# Patient Record
Sex: FEMALE | Race: WHITE | NOT HISPANIC OR LATINO | Employment: OTHER | URBAN - METROPOLITAN AREA
[De-identification: names, ages, dates, MRNs, and addresses within clinical notes are randomized per-mention and may not be internally consistent; named-entity substitution may affect disease eponyms.]

---

## 2017-02-07 ENCOUNTER — HISTORICAL (OUTPATIENT)
Dept: FAMILY MEDICINE | Facility: CLINIC | Age: 77
End: 2017-02-07

## 2017-05-17 ENCOUNTER — HISTORICAL (OUTPATIENT)
Dept: FAMILY MEDICINE | Facility: CLINIC | Age: 77
End: 2017-05-17

## 2017-05-17 LAB
ABS NEUT (OLG): 4.86 X10(3)/MCL (ref 2.1–9.2)
ALBUMIN SERPL-MCNC: 4.5 GM/DL (ref 3.4–5)
ALBUMIN/GLOB SERPL: 1 {RATIO}
ALP SERPL-CCNC: 67 UNIT/L (ref 20–120)
ALT SERPL-CCNC: 18 UNIT/L
AST SERPL-CCNC: 23 UNIT/L
BASOPHILS # BLD AUTO: 0.03 X10(3)/MCL
BASOPHILS NFR BLD AUTO: 0 % (ref 0–1)
BILIRUB SERPL-MCNC: 0.9 MG/DL
BILIRUBIN DIRECT+TOT PNL SERPL-MCNC: 0.1 MG/DL
BILIRUBIN DIRECT+TOT PNL SERPL-MCNC: 0.8 MG/DL
BUN SERPL-MCNC: 15 MG/DL (ref 7–25)
CALCIUM SERPL-MCNC: 9.3 MG/DL (ref 8.4–10.3)
CHLORIDE SERPL-SCNC: 106 MMOL/L (ref 96–110)
CHOLEST SERPL-MCNC: 182 MG/DL
CHOLEST/HDLC SERPL: 2.9 {RATIO} (ref 0–4.4)
CO2 SERPL-SCNC: 26 MMOL/L (ref 24–32)
CREAT SERPL-MCNC: 0.95 MG/DL (ref 0.7–1.1)
EOSINOPHIL # BLD AUTO: 0.04 10*3/UL
EOSINOPHIL NFR BLD AUTO: 0 % (ref 0–5)
ERYTHROCYTE [DISTWIDTH] IN BLOOD BY AUTOMATED COUNT: 13.2 % (ref 11.5–14.5)
EST. AVERAGE GLUCOSE BLD GHB EST-MCNC: 108 MG/DL
GLOBULIN SER-MCNC: 3.1 GM/ML (ref 2.3–3.5)
GLUCOSE SERPL-MCNC: 107 MG/DL (ref 65–99)
HBA1C MFR BLD: 5.4 % (ref 4.7–5.6)
HCT VFR BLD AUTO: 42.1 % (ref 35–46)
HDLC SERPL-MCNC: 62 MG/DL
HGB BLD-MCNC: 13.7 GM/DL (ref 12–16)
IMM GRANULOCYTES # BLD AUTO: 0.02 10*3/UL
IMM GRANULOCYTES NFR BLD AUTO: 0 %
LDLC SERPL CALC-MCNC: 102 MG/DL (ref 0–130)
LYMPHOCYTES # BLD AUTO: 3.42 X10(3)/MCL
LYMPHOCYTES NFR BLD AUTO: 39 % (ref 15–40)
MCH RBC QN AUTO: 29.5 PG (ref 26–34)
MCHC RBC AUTO-ENTMCNC: 32.5 GM/DL (ref 31–37)
MCV RBC AUTO: 90.7 FL (ref 80–100)
MONOCYTES # BLD AUTO: 0.48 X10(3)/MCL
MONOCYTES NFR BLD AUTO: 5 % (ref 4–12)
NEUTROPHILS # BLD AUTO: 4.86 X10(3)/MCL
NEUTROPHILS NFR BLD AUTO: 55 X10(3)/MCL
PLATELET # BLD AUTO: 322 X10(3)/MCL (ref 130–400)
PMV BLD AUTO: 11.5 FL (ref 7.4–10.4)
POTASSIUM SERPL-SCNC: 4 MMOL/L (ref 3.6–5.2)
PROT SERPL-MCNC: 7.6 GM/DL (ref 6–8)
RBC # BLD AUTO: 4.64 X10(6)/MCL (ref 4–5.2)
SODIUM SERPL-SCNC: 141 MMOL/L (ref 135–146)
TRIGL SERPL-MCNC: 89 MG/DL
TSH SERPL-ACNC: 1.38 MIU/L (ref 0.5–5)
VLDLC SERPL CALC-MCNC: 18 MG/DL
WBC # SPEC AUTO: 8.8 X10(3)/MCL (ref 4.5–11)

## 2017-10-24 ENCOUNTER — HISTORICAL (OUTPATIENT)
Dept: ADMINISTRATIVE | Facility: HOSPITAL | Age: 77
End: 2017-10-24

## 2017-10-24 LAB
CREAT UR-MCNC: 163 MG/DL
MICROALBUMIN UR-MCNC: 12.4 MG/L (ref 0–19)
MICROALBUMIN/CREAT RATIO PNL UR: 7.6 MCG/MG CR (ref 0–29)

## 2017-11-20 ENCOUNTER — HISTORICAL (OUTPATIENT)
Dept: FAMILY MEDICINE | Facility: CLINIC | Age: 77
End: 2017-11-20

## 2018-04-17 ENCOUNTER — HISTORICAL (OUTPATIENT)
Dept: ADMINISTRATIVE | Facility: HOSPITAL | Age: 78
End: 2018-04-17

## 2018-04-17 LAB
ABS NEUT (OLG): 5.18 X10(3)/MCL (ref 2.1–9.2)
ALBUMIN SERPL-MCNC: 4 GM/DL (ref 3.4–5)
ALBUMIN/GLOB SERPL: 1 RATIO (ref 1–2)
ALP SERPL-CCNC: 98 UNIT/L (ref 45–117)
ALT SERPL-CCNC: 29 UNIT/L (ref 12–78)
AST SERPL-CCNC: 22 UNIT/L (ref 15–37)
BASOPHILS # BLD AUTO: 0.04 X10(3)/MCL
BASOPHILS NFR BLD AUTO: 0 %
BILIRUB SERPL-MCNC: 0.3 MG/DL (ref 0.2–1)
BILIRUBIN DIRECT+TOT PNL SERPL-MCNC: 0.1 MG/DL
BILIRUBIN DIRECT+TOT PNL SERPL-MCNC: 0.2 MG/DL
BUN SERPL-MCNC: 20 MG/DL (ref 7–18)
CALCIUM SERPL-MCNC: 8.9 MG/DL (ref 8.5–10.1)
CHLORIDE SERPL-SCNC: 112 MMOL/L (ref 98–107)
CHOLEST SERPL-MCNC: 181 MG/DL
CHOLEST/HDLC SERPL: 2.4 {RATIO} (ref 0–4.4)
CO2 SERPL-SCNC: 27 MMOL/L (ref 21–32)
CREAT SERPL-MCNC: 1 MG/DL (ref 0.6–1.3)
CREAT UR-MCNC: 134 MG/DL
EOSINOPHIL # BLD AUTO: 0.04 X10(3)/MCL
EOSINOPHIL NFR BLD AUTO: 0 %
ERYTHROCYTE [DISTWIDTH] IN BLOOD BY AUTOMATED COUNT: 13.5 % (ref 11.5–14.5)
EST. AVERAGE GLUCOSE BLD GHB EST-MCNC: 108 MG/DL
GLOBULIN SER-MCNC: 3.8 GM/ML (ref 2.3–3.5)
GLUCOSE SERPL-MCNC: 99 MG/DL (ref 74–106)
HBA1C MFR BLD: 5.4 % (ref 4.2–6.3)
HCT VFR BLD AUTO: 41.6 % (ref 35–46)
HDLC SERPL-MCNC: 74 MG/DL
HGB BLD-MCNC: 13.4 GM/DL (ref 12–16)
IMM GRANULOCYTES # BLD AUTO: 0.02 10*3/UL
IMM GRANULOCYTES NFR BLD AUTO: 0 %
LDLC SERPL CALC-MCNC: 86 MG/DL (ref 0–130)
LYMPHOCYTES # BLD AUTO: 2.86 X10(3)/MCL
LYMPHOCYTES NFR BLD AUTO: 33 % (ref 13–40)
MCH RBC QN AUTO: 29.6 PG (ref 26–34)
MCHC RBC AUTO-ENTMCNC: 32.2 GM/DL (ref 31–37)
MCV RBC AUTO: 91.8 FL (ref 80–100)
MICROALBUMIN UR-MCNC: 9.5 MG/L (ref 0–19)
MICROALBUMIN/CREAT RATIO PNL UR: 7.1 MCG/MG CR (ref 0–29)
MONOCYTES # BLD AUTO: 0.64 X10(3)/MCL
MONOCYTES NFR BLD AUTO: 7 % (ref 4–12)
NEUTROPHILS # BLD AUTO: 5.18 X10(3)/MCL
NEUTROPHILS NFR BLD AUTO: 59 X10(3)/MCL
PLATELET # BLD AUTO: 313 X10(3)/MCL (ref 130–400)
PMV BLD AUTO: 11.7 FL (ref 7.4–10.4)
POTASSIUM SERPL-SCNC: 3.7 MMOL/L (ref 3.5–5.1)
PROT SERPL-MCNC: 7.8 GM/DL (ref 6.4–8.2)
RBC # BLD AUTO: 4.53 X10(6)/MCL (ref 4–5.2)
SODIUM SERPL-SCNC: 141 MMOL/L (ref 136–145)
TRIGL SERPL-MCNC: 106 MG/DL
TSH SERPL-ACNC: 2.24 MIU/L (ref 0.36–3.74)
VLDLC SERPL CALC-MCNC: 21 MG/DL
WBC # SPEC AUTO: 8.8 X10(3)/MCL (ref 4.5–11)

## 2018-06-27 ENCOUNTER — HISTORICAL (OUTPATIENT)
Dept: RADIOLOGY | Facility: HOSPITAL | Age: 78
End: 2018-06-27

## 2019-06-28 ENCOUNTER — HISTORICAL (OUTPATIENT)
Dept: ADMINISTRATIVE | Facility: HOSPITAL | Age: 79
End: 2019-06-28

## 2019-06-28 LAB
ABS NEUT (OLG): 4.99 X10(3)/MCL (ref 2.1–9.2)
ALBUMIN SERPL-MCNC: 4.4 GM/DL (ref 3.4–5)
ALBUMIN/GLOB SERPL: 1.1 RATIO (ref 1.1–2)
ALP SERPL-CCNC: 99 UNIT/L (ref 45–117)
ALT SERPL-CCNC: 25 UNIT/L (ref 12–78)
APPEARANCE, UA: CLEAR
AST SERPL-CCNC: 18 UNIT/L (ref 15–37)
BACTERIA #/AREA URNS AUTO: ABNORMAL /[HPF]
BASOPHILS # BLD AUTO: 0.04 X10(3)/MCL
BASOPHILS NFR BLD AUTO: 0 %
BILIRUB SERPL-MCNC: 0.5 MG/DL (ref 0.2–1)
BILIRUB UR QL STRIP: NEGATIVE
BILIRUBIN DIRECT+TOT PNL SERPL-MCNC: 0.1 MG/DL
BILIRUBIN DIRECT+TOT PNL SERPL-MCNC: 0.4 MG/DL
BUN SERPL-MCNC: 14 MG/DL (ref 7–18)
CALCIUM SERPL-MCNC: 10.2 MG/DL (ref 8.5–10.1)
CHLORIDE SERPL-SCNC: 105 MMOL/L (ref 98–107)
CHOLEST SERPL-MCNC: 201 MG/DL
CHOLEST/HDLC SERPL: 3 {RATIO} (ref 0–4.4)
CO2 SERPL-SCNC: 29 MMOL/L (ref 21–32)
COLOR UR: ABNORMAL
CREAT SERPL-MCNC: 1 MG/DL (ref 0.6–1.3)
CREAT UR-MCNC: 17 MG/DL
EOSINOPHIL # BLD AUTO: 0.05 10*3/UL
EOSINOPHIL NFR BLD AUTO: 1 %
ERYTHROCYTE [DISTWIDTH] IN BLOOD BY AUTOMATED COUNT: 13 % (ref 11.5–14.5)
EST. AVERAGE GLUCOSE BLD GHB EST-MCNC: 117 MG/DL
GLOBULIN SER-MCNC: 3.9 GM/ML (ref 2.3–3.5)
GLUCOSE (UA): NORMAL
GLUCOSE SERPL-MCNC: 97 MG/DL (ref 74–106)
HBA1C MFR BLD: 5.7 % (ref 4.2–6.3)
HCT VFR BLD AUTO: 46.6 % (ref 35–46)
HDLC SERPL-MCNC: 68 MG/DL
HGB BLD-MCNC: 14.7 GM/DL (ref 12–16)
HGB UR QL STRIP: 0.03 MG/DL
HYALINE CASTS #/AREA URNS LPF: ABNORMAL /[LPF]
IMM GRANULOCYTES # BLD AUTO: 0.02 10*3/UL
IMM GRANULOCYTES NFR BLD AUTO: 0 %
KETONES UR QL STRIP: NEGATIVE
LDLC SERPL CALC-MCNC: 105 MG/DL (ref 0–130)
LEUKOCYTE ESTERASE UR QL STRIP: NEGATIVE
LYMPHOCYTES # BLD AUTO: 3.37 X10(3)/MCL
LYMPHOCYTES NFR BLD AUTO: 38 % (ref 13–40)
MCH RBC QN AUTO: 29 PG (ref 26–34)
MCHC RBC AUTO-ENTMCNC: 31.5 GM/DL (ref 31–37)
MCV RBC AUTO: 91.9 FL (ref 80–100)
MICROALBUMIN UR-MCNC: <5 MG/L (ref 0–19)
MICROALBUMIN/CREAT RATIO PNL UR: <29.4 MCG/MG CR (ref 0–29)
MONOCYTES # BLD AUTO: 0.49 X10(3)/MCL
MONOCYTES NFR BLD AUTO: 6 % (ref 4–12)
NEUTROPHILS # BLD AUTO: 4.99 X10(3)/MCL
NEUTROPHILS NFR BLD AUTO: 56 X10(3)/MCL
NITRITE UR QL STRIP: NEGATIVE
PH UR STRIP: 7 [PH] (ref 4.5–8)
PLATELET # BLD AUTO: 336 X10(3)/MCL (ref 130–400)
PMV BLD AUTO: 11.2 FL (ref 7.4–10.4)
POTASSIUM SERPL-SCNC: 3.9 MMOL/L (ref 3.5–5.1)
PROT SERPL-MCNC: 8.3 GM/DL (ref 6.4–8.2)
PROT UR QL STRIP: NEGATIVE
RBC # BLD AUTO: 5.07 X10(6)/MCL (ref 4–5.2)
RBC #/AREA URNS AUTO: ABNORMAL /[HPF]
SODIUM SERPL-SCNC: 138 MMOL/L (ref 136–145)
SP GR UR STRIP: 1 (ref 1–1.03)
SQUAMOUS #/AREA URNS LPF: ABNORMAL /[LPF]
TRIGL SERPL-MCNC: 141 MG/DL
UROBILINOGEN UR STRIP-ACNC: NORMAL
VLDLC SERPL CALC-MCNC: 28 MG/DL
WBC # SPEC AUTO: 9 X10(3)/MCL (ref 4.5–11)
WBC #/AREA URNS AUTO: ABNORMAL /HPF

## 2019-07-08 ENCOUNTER — HISTORICAL (OUTPATIENT)
Dept: RADIOLOGY | Facility: HOSPITAL | Age: 79
End: 2019-07-08

## 2020-03-09 ENCOUNTER — HISTORICAL (OUTPATIENT)
Dept: RADIOLOGY | Facility: HOSPITAL | Age: 80
End: 2020-03-09

## 2020-03-09 LAB
BUN SERPL-MCNC: 18 MG/DL (ref 7–18)
CALCIUM SERPL-MCNC: 9.7 MG/DL (ref 8.5–10.1)
CHLORIDE SERPL-SCNC: 108 MMOL/L (ref 98–107)
CO2 SERPL-SCNC: 28 MMOL/L (ref 21–32)
CREAT SERPL-MCNC: 1 MG/DL (ref 0.6–1.3)
CREAT/UREA NIT SERPL: 18
GLUCOSE SERPL-MCNC: 104 MG/DL (ref 74–106)
POTASSIUM SERPL-SCNC: 3.9 MMOL/L (ref 3.5–5.1)
SODIUM SERPL-SCNC: 141 MMOL/L (ref 136–145)

## 2020-08-03 ENCOUNTER — HISTORICAL (OUTPATIENT)
Dept: RADIOLOGY | Facility: HOSPITAL | Age: 80
End: 2020-08-03

## 2020-11-20 ENCOUNTER — HISTORICAL (OUTPATIENT)
Dept: ADMINISTRATIVE | Facility: HOSPITAL | Age: 80
End: 2020-11-20

## 2020-11-20 LAB
ABS NEUT (OLG): 4.94 X10(3)/MCL (ref 2.1–9.2)
ALBUMIN SERPL-MCNC: 4.3 GM/DL (ref 3.4–4.8)
ALBUMIN/GLOB SERPL: 1.2 RATIO (ref 1.1–2)
ALP SERPL-CCNC: 86 UNIT/L (ref 40–150)
ALT SERPL-CCNC: 15 UNIT/L (ref 0–55)
APPEARANCE, UA: CLEAR
AST SERPL-CCNC: 23 UNIT/L (ref 5–34)
BACTERIA #/AREA URNS AUTO: ABNORMAL /HPF
BASOPHILS # BLD AUTO: 0 X10(3)/MCL (ref 0–0.2)
BASOPHILS NFR BLD AUTO: 0 %
BILIRUB SERPL-MCNC: 0.3 MG/DL
BILIRUB UR QL STRIP: NEGATIVE
BILIRUBIN DIRECT+TOT PNL SERPL-MCNC: 0.1 MG/DL (ref 0–0.5)
BILIRUBIN DIRECT+TOT PNL SERPL-MCNC: 0.2 MG/DL (ref 0–0.8)
BUN SERPL-MCNC: 14 MG/DL (ref 9.8–20.1)
CALCIUM SERPL-MCNC: 9.7 MG/DL (ref 8.4–10.2)
CHLORIDE SERPL-SCNC: 106 MMOL/L (ref 98–107)
CHOLEST SERPL-MCNC: 189 MG/DL
CHOLEST/HDLC SERPL: 3 {RATIO} (ref 0–5)
CO2 SERPL-SCNC: 23 MMOL/L (ref 23–31)
COLOR UR: YELLOW
CREAT SERPL-MCNC: 0.88 MG/DL (ref 0.55–1.02)
DEPRECATED CALCIDIOL+CALCIFEROL SERPL-MC: 64.4 NG/ML (ref 30–80)
EOSINOPHIL # BLD AUTO: 0 X10(3)/MCL (ref 0–0.9)
EOSINOPHIL NFR BLD AUTO: 1 %
ERYTHROCYTE [DISTWIDTH] IN BLOOD BY AUTOMATED COUNT: 13.2 % (ref 11.5–14.5)
EST. AVERAGE GLUCOSE BLD GHB EST-MCNC: 108.3 MG/DL
GLOBULIN SER-MCNC: 3.6 GM/DL (ref 2.4–3.5)
GLUCOSE (UA): NEGATIVE
GLUCOSE SERPL-MCNC: 71 MG/DL (ref 82–115)
HBA1C MFR BLD: 5.4 %
HCT VFR BLD AUTO: 45.2 % (ref 35–46)
HDLC SERPL-MCNC: 55 MG/DL (ref 35–60)
HGB BLD-MCNC: 14.2 GM/DL (ref 12–16)
HGB UR QL STRIP: 0.2
HYALINE CASTS #/AREA URNS LPF: ABNORMAL /LPF
IMM GRANULOCYTES # BLD AUTO: 0.02 10*3/UL
IMM GRANULOCYTES NFR BLD AUTO: 0 %
KETONES UR QL STRIP: NEGATIVE
LDLC SERPL CALC-MCNC: 103 MG/DL (ref 50–140)
LEUKOCYTE ESTERASE UR QL STRIP: 75 LEU/UL
LYMPHOCYTES # BLD AUTO: 3.4 X10(3)/MCL (ref 0.6–4.6)
LYMPHOCYTES NFR BLD AUTO: 38 %
MCH RBC QN AUTO: 29 PG (ref 26–34)
MCHC RBC AUTO-ENTMCNC: 31.4 GM/DL (ref 31–37)
MCV RBC AUTO: 92.2 FL (ref 80–100)
MONOCYTES # BLD AUTO: 0.5 X10(3)/MCL (ref 0.1–1.3)
MONOCYTES NFR BLD AUTO: 6 %
NEUTROPHILS # BLD AUTO: 4.94 X10(3)/MCL (ref 2.1–9.2)
NEUTROPHILS NFR BLD AUTO: 55 %
NITRITE UR QL STRIP: NEGATIVE
PH UR STRIP: 5.5 [PH] (ref 4.5–8)
PLATELET # BLD AUTO: 306 X10(3)/MCL (ref 130–400)
PMV BLD AUTO: 12.3 FL (ref 7.4–10.4)
POTASSIUM SERPL-SCNC: 4 MMOL/L (ref 3.5–5.1)
PROT SERPL-MCNC: 7.9 GM/DL (ref 5.8–7.6)
PROT UR QL STRIP: NEGATIVE
RBC # BLD AUTO: 4.9 X10(6)/MCL (ref 4–5.2)
RBC #/AREA URNS AUTO: ABNORMAL /HPF
SODIUM SERPL-SCNC: 143 MMOL/L (ref 136–145)
SP GR UR STRIP: 1.02 (ref 1–1.03)
SQUAMOUS #/AREA URNS LPF: ABNORMAL /LPF
TRIGL SERPL-MCNC: 155 MG/DL (ref 37–140)
TSH SERPL-ACNC: 2.1 UIU/ML (ref 0.35–4.94)
UROBILINOGEN UR STRIP-ACNC: NORMAL
VLDLC SERPL CALC-MCNC: 31 MG/DL
WBC # SPEC AUTO: 8.9 X10(3)/MCL (ref 4.5–11)
WBC #/AREA URNS AUTO: ABNORMAL /HPF

## 2020-11-22 LAB — FINAL CULTURE: NO GROWTH

## 2021-03-18 ENCOUNTER — HISTORICAL (OUTPATIENT)
Dept: RADIOLOGY | Facility: HOSPITAL | Age: 81
End: 2021-03-18

## 2022-04-11 ENCOUNTER — HISTORICAL (OUTPATIENT)
Dept: ADMINISTRATIVE | Facility: HOSPITAL | Age: 82
End: 2022-04-11
Payer: MEDICARE

## 2022-04-26 VITALS
SYSTOLIC BLOOD PRESSURE: 144 MMHG | BODY MASS INDEX: 27.92 KG/M2 | WEIGHT: 129.44 LBS | OXYGEN SATURATION: 100 % | DIASTOLIC BLOOD PRESSURE: 76 MMHG | HEIGHT: 57 IN

## 2022-05-04 NOTE — HISTORICAL [facility]
This is a historical note converted from [place]. Formatting and pictures may have been removed.  Please reference [place] for original formatting and attached multimedia. Chief Complaint  Routine visit for refill on Rx  History of Present Illness  81 yo WF  ?   HTN: BP at goal today 124/78, Compliant with?medications: Norvasc 10mg?and?Lisinopril 2.5mg daily. ?Denies any?lightheadedness or dizziness  ?   HLD:?compliant with Pravastatin 40mg?QD, no myalgias. last FLP 6/19 chol 201  ?   CKD: IIIA -?avoiding NSAIDS and other nephrotoxic medications  ?   Osteopenia: DEXA 11/17-Mild osteopenia in the lumbar spine and femoral necks.  Average T score values are not significantly changed since 2014.  -did not want to take fosamax, complaint with?calcium /vitamin D supplementation  ?   MMG last 08/2020 BIRADS 1  Review of Systems  Constitutional: no fevers  respiratory: no SOB  Physical Exam  Vitals & Measurements  T:?36.6? ?C (Oral)? HR:?79(Peripheral)? RR:?16? BP:?124/78?  HT:?145.00?cm? WT:?60.700?kg? BMI:?28.87?  General: appears well, in no acute distress  Eye: no scleral icterus  HENT: MMM, torus palatinus,?oropharynx without erythema/exudate  Neck: supple, no lymphadenopathy, no carotid bruits  Respiratory: clear to auscultation bilaterally, nonlabored respirations  Cardiovascular: regular rate and rhythm without murmurs or?gallops, no edema  Gastrointestinal:?soft, non-tender, non-distended  Genitourinary: no suprapubic tenderness  Musculoskeletal: no gross deformities  Integumentary: no acute rashes or skin lesions present  Neuro: Mild facial tremor at rest, no masked facies, no?extremity?resting or?intentional tremor, no cogwheel rigidity, no leadpipe?rigidity  Assessment/Plan  1.?Hypertension?I10  ?At goal, continue current meds  Advise home BP monitoring  Labs as below  ?  RTC 3 months  Ordered:  CBC w/ Auto Diff, Routine collect, 11/20/20 13:40:00 CST, Blood, Stop date 11/20/20 13:40:00 CST, Lab Collect, Venous  Draw, Hypertension, Print Label By Order Location, 11/20/20 12:50:00 CST  Clinic Follow up, *Est. 02/20/21 3:00:00 CST, Order for future visit, Hypertension  Chronic Kidney Disease (CKD), Stage II (Mild)(  Confirmed  )  Hyperlipidemia(  Confirmed  )  Osteopenia, [facility]  Comprehensive Metabolic Panel, Routine collect, 11/20/20 13:40:00 CST, Blood, Stop date 11/20/20 13:40:00 CST, Lab Collect, Venous Draw, Hypertension, Print Label By Order Location, 11/20/20 12:50:00 CST  Hemoglobin A1C [facility], Routine collect, 11/20/20 13:40:00 CST, Blood, Stop date 11/20/20 13:40:00 CST, Lab Collect, Venous Draw, Hypertension, Print Label By Order Location, 11/20/20 12:50:00 CST  Lipid Panel, Routine collect, 11/20/20 13:40:00 CST, Blood, Stop date 11/20/20 13:40:00 CST, Lab Collect, Venous Draw, Hypertension, Print Label By Order Location, 11/20/20 12:50:00 CST  Thyroid Stimulating Hormone, Routine collect, 11/20/20 13:40:00 CST, Blood, Stop date 11/20/20 13:40:00 CST, Lab Collect, Venous Draw, Hypertension, Print Label By Order Location, 11/20/20 12:50:00 CST  Urinalysis Microscopic [facility], Routine collect, Urine, Collected, 11/20/20 13:15:00 CST, Stop date 11/20/20 13:15:00 CST, Nurse collect, Hypertension, Print Label By Order Location  Urinalysis with Microscopic if Indicated, Routine collect, Urine, 11/20/20 13:39:00 CST, Stop date 11/20/20 13:39:00 CST, Nurse collect, Hypertension, Print Label By Order Location  Vitamin D, 25-Hydroxy Level, Routine collect, 11/20/20 13:40:00 CST, Blood, Stop date 11/20/20 13:40:00 CST, Lab Collect, Venous Draw, Hypertension, 11/20/20 12:50:00 CST  ?  2.?Chronic Kidney Disease (CKD), Stage II (Mild)(  Confirmed  )?N18.2  ?Cautioned against use of NSAIDs,?advised to continue good oral hydration at home  Ordered:  Clinic Follow up, *Est. 02/20/21 3:00:00 CST, Order for future visit, Hypertension  Chronic Kidney Disease (CKD), Stage II (Mild)(  Confirmed  )   Hyperlipidemia(  Confirmed  )  Osteopenia, [facility]  ?  3.?Hyperlipidemia(  Confirmed  )?E78.5  ?Lipid panel today, patient ate breakfast but not lunch, will obtain due to COVID-19 limiting their ability to get to clinic  Ordered:  Clinic Follow up, *Est. 02/20/21 3:00:00 CST, Order for future visit, Hypertension  Chronic Kidney Disease (CKD), Stage II (Mild)(  Confirmed  )  Hyperlipidemia(  Confirmed  )  Osteopenia, [facility]  ?  4.?Osteopenia?M85.80  ?Again?declines bisphosphonate  Continue vitamin D and calcium supplementation  Ordered:  Clinic Follow up, *Est. 02/20/21 3:00:00 CST, Order for future visit, Hypertension  Chronic Kidney Disease (CKD), Stage II (Mild)(  Confirmed  )  Hyperlipidemia(  Confirmed  )  Osteopenia, [facility]  ?  Referrals  Clinic Follow up, *Est. 02/20/21 3:00:00 CST, Order for future visit, Hypertension  Chronic Kidney Disease (CKD), Stage II (Mild)(  Confirmed  )  Hyperlipidemia(  Confirmed  )  Osteopenia, [facility]   Problem List/Past Medical History  Ongoing  Chronic Kidney Disease (CKD), Stage II (Mild)(  Confirmed  )  Hyperlipidemia(  Confirmed  )  Hypertension  Historical  No qualifying data  Procedure/Surgical History  Inspection of Lower Intestinal Tract, Via Natural or Artificial Opening Endoscopic (10/10/2016)  Sigmoidoscopy, flexible; diagnostic, including collection of specimen(s) by brushing or washing, when performed (separate procedure) (10/10/2016)  sinus surgery  tonsilllectomy  tubaligation   Medications  amlodipine 10 mg oral tablet, 10 mg= 1 tab(s), Oral, Daily, 3 refills  aspirin 81 mg oral tablet, 81 mg= 1 tab(s), Oral, Daily  lisinopril 2.5 mg oral tablet, See Instructions, 3 refills  multivitamin with minerals (Adult Tab), 1 tab(s), Oral, Daily  pravastatin 40 mg oral tablet, 40 mg= 1 tab(s), Oral, Once a day (at bedtime), 3 refills  Vitamin D3 1000 intl units oral  tablet, 1000 International unit= 1 tab(s), Oral, Daily, 3 refills  Allergies  No Known Allergies  Social History  Abuse/Neglect  No, No, Yes, 07/31/2020  Alcohol - Denies Alcohol Use, 11/04/2015  Wine, 1-2 times per month, Alcohol use interferes with work or home: No. Others hurt by drinking: No. Household alcohol concerns: Yes., 06/28/2019  Employment/School  Retired, 04/29/2015  Home/Environment  Lives with Spouse. Alcohol abuse in household: No. Substance abuse in household: No. Smoker in household: No. Injuries/Abuse/Neglect in household: No. Feels unsafe at home: No. Safe place to go: Yes. Agency(s)/Others notified: No. Family/Friends available for support: Yes. Concern for family members at home: No. Major illness in household: No. Financial concerns: Yes. TV/Computer concerns: No., 01/15/2019  Nutrition/Health  Regular, Wants to lose weight: No. Sleeping concerns: No. Feels highly stressed: No., 01/15/2019  Sexual  Sexually active: No. Sexually active at age 20 Years. Number of current partners 1. Number of lifetime partners 1. Sexual orientation: Straight or heterosexual. Uses condoms: No. History of sexual abuse: No. Gender Identity Identifies as female., 01/15/2019  Substance Use - Denies Substance Abuse, 11/04/2015  Never, 04/29/2015  Tobacco - Denies Tobacco Use, 11/04/2015  Never (less than 100 in lifetime), No, 11/20/2020  Family History  Malignant lymphoma: Brother.  Tobacco abuse: Father.  Immunizations  Vaccine Date Status   influenza virus vaccine, inactivated 10/03/2020 Recorded   influenza virus vaccine, inactivated 10/02/2019 Recorded   influenza virus vaccine, inactivated 09/20/2018 Recorded   zoster vaccine live 03/06/2018 Recorded   influenza virus vaccine, inactivated 10/24/2017 Given   pneumococcal 23-polyvalent vaccine 05/23/2017 Given   influenza virus vaccine, inactivated 11/11/2016 Given   influenza virus vaccine, inactivated 11/04/2015 Given   influenza virus vaccine, inactivated  10/07/2014 Recorded   influenza virus vaccine, inactivated 10/23/2012 Recorded   influenza virus vaccine, inactivated 10/07/2011 Recorded   influenza virus vaccine, inactivated 10/28/2010 Recorded   influenza virus vaccine, inactivated 10/14/2009 Recorded   influenza virus vaccine, inactivated 11/14/2007 Recorded   influenza virus vaccine, inactivated 11/18/2005 Recorded   Health Maintenance  Health Maintenance  ???Pending?(in the next year)  ??? ??OverDue  ??? ? ? ?Hypertension Management-Education due??04/17/19??and every 1??year(s)  ??? ? ? ?Bone Density Screening due??11/20/19??and every 2??year(s)  ??? ? ? ?Advance Directive due??01/02/20??and every 1??year(s)  ??? ??Due?  ??? ? ? ?Medicare Annual Wellness Exam due??11/20/20??and every 1??year(s)  ??? ? ? ?Zoster Vaccine due??11/20/20??Unknown Frequency  ??? ??Due In Future?  ??? ? ? ?Obesity Screening not due until??01/01/21??and every 1??year(s)  ??? ? ? ?Cognitive Screening not due until??01/02/21??and every 1??year(s)  ??? ? ? ?Fall Risk Assessment not due until??01/02/21??and every 1??year(s)  ??? ? ? ?Functional Assessment not due until??01/02/21??and every 1??year(s)  ??? ? ? ?ADL Screening not due until??02/07/21??and every 1??year(s)  ??? ? ? ?Diabetes Screening not due until??03/09/21??and every 1??year(s)  ??? ? ? ?Hypertension Management-BMP not due until??03/09/21??and every 1??year(s)  ??? ? ? ?Aspirin Therapy for CVD Prevention not due until??07/31/21??and every 1??year(s)  ??? ? ? ?Influenza Vaccine not due until??10/01/21??and every 1??day(s)  ???Satisfied?(in the past 1 year)  ??? ??Satisfied?  ??? ? ? ?ADL Screening on??02/07/20.??Satisfied by Yanci Youngblood LPN  ??? ? ? ?Aspirin Therapy for CVD Prevention on??07/31/20.??Satisfied by Rosangela Rowan MD  ??? ? ? ?Blood Pressure Screening on??11/20/20.??Satisfied by Gordy Mendoza  ??? ? ? ?Body Mass Index Check on??11/20/20.??Satisfied by Gordy Mendoza  ??? ? ? ?Breast Cancer  Screening on??08/03/20.??Satisfied by Arlette Kraft  ??? ? ? ?Cognitive Screening on??11/20/20.??Satisfied by Gordy Mendoza  ??? ? ? ?Depression Screening on??11/20/20.??Satisfied by Gordy Mendoza  ??? ? ? ?Diabetes Screening on??03/09/20.??Satisfied by Guadalupe Mandel  ??? ? ? ?Fall Risk Assessment on??11/20/20.??Satisfied by Gordy Mendoza  ??? ? ? ?Functional Assessment on??02/07/20.??Satisfied by Yanci Youngblood LPN  ??? ? ? ?Hypertension Management-Blood Pressure on??11/20/20.??Satisfied by Gordy Mendoza  ??? ? ? ?Hypertension Management-BMP on??03/09/20.??Satisfied by Guadalupe Mandel  ??? ? ? ?Influenza Vaccine on??10/03/20.??Satisfied by Gordy Mendoza  ??? ? ? ?Obesity Screening on??11/20/20.??Satisfied by Gordy Mendoza  ?      ????I saw and evaluated the patient. I discussed with the resident and agree with the residents findings and plan as documented in the residents note.

## 2022-05-06 ENCOUNTER — OFFICE VISIT (OUTPATIENT)
Dept: FAMILY MEDICINE | Facility: CLINIC | Age: 82
End: 2022-05-06
Payer: MEDICARE

## 2022-05-06 VITALS
DIASTOLIC BLOOD PRESSURE: 78 MMHG | TEMPERATURE: 97 F | SYSTOLIC BLOOD PRESSURE: 135 MMHG | BODY MASS INDEX: 27.57 KG/M2 | RESPIRATION RATE: 20 BRPM | OXYGEN SATURATION: 98 % | HEART RATE: 58 BPM | HEIGHT: 57 IN | WEIGHT: 127.81 LBS

## 2022-05-06 DIAGNOSIS — E78.5 HYPERLIPIDEMIA, UNSPECIFIED HYPERLIPIDEMIA TYPE: ICD-10-CM

## 2022-05-06 DIAGNOSIS — I10 HYPERTENSION, UNSPECIFIED TYPE: Primary | ICD-10-CM

## 2022-05-06 PROBLEM — N18.2 STAGE 2 CHRONIC KIDNEY DISEASE: Status: ACTIVE | Noted: 2022-05-06

## 2022-05-06 PROCEDURE — 99213 OFFICE O/P EST LOW 20 MIN: CPT | Mod: PBBFAC

## 2022-05-06 RX ORDER — AMLODIPINE BESYLATE 10 MG/1
10 TABLET ORAL DAILY
Qty: 90 EACH | Refills: 3 | Status: SHIPPED | OUTPATIENT
Start: 2022-05-06 | End: 2022-05-06 | Stop reason: SDUPTHER

## 2022-05-06 RX ORDER — AMLODIPINE BESYLATE 10 MG/1
10 TABLET ORAL DAILY
COMMUNITY
Start: 2022-01-11 | End: 2023-01-27 | Stop reason: SDUPTHER

## 2022-05-06 RX ORDER — PRAVASTATIN SODIUM 40 MG/1
40 TABLET ORAL DAILY
Qty: 90 TABLET | Refills: 3 | Status: SHIPPED | OUTPATIENT
Start: 2022-05-06 | End: 2023-01-27 | Stop reason: SDUPTHER

## 2022-05-06 RX ORDER — LISINOPRIL 20 MG/1
20 TABLET ORAL DAILY
Qty: 90 TABLET | Refills: 3 | Status: SHIPPED | OUTPATIENT
Start: 2022-05-06 | End: 2023-01-27 | Stop reason: SDUPTHER

## 2022-05-06 RX ORDER — NAPROXEN SODIUM 220 MG/1
81 TABLET, FILM COATED ORAL DAILY
Qty: 30 TABLET | Refills: 3 | Status: SHIPPED | OUTPATIENT
Start: 2022-05-06 | End: 2023-01-27 | Stop reason: SDUPTHER

## 2022-05-06 NOTE — PROGRESS NOTES
Yue Encarnacion is a 82yr female w/ PMH HTN, HLD, CKD. She was last seen in our clinic 1/11/22.    Acute issues:  No complaints or concerns.  Medication refills.    Chronic issues:  Hypertension:  -Compliant with medications.  -No side effects of medications.  -Denies chest pain, SOB, abdominal pain, N/V, diaphoresis, headache, vision changes, and dizziness    HLD:  -Compliant with medication  -Denies muscle cramps      Activities of Daily Living:   Able to bath, dress, transfer, toilet, continent, feed on her own    Instrumental activities of daily living:  -Able to use telephone, shop, prepare meals, housekeeping, laundry, and handle her and her 's medications on her own  -Doesn't drive        Review of Systems:  Constitutional: no N/V, no headaches, no dizziness  CV: no edema, no chest pain, no SOB  GI: no abdominal pain, no diarrhea, no constipation, no hematuria   Neuro: no numbness/tingling, no generalized weakness, no falls       PE:  Vitals:    05/06/22 0912   BP: 135/78   Pulse: (!) 58   Resp: 20   Temp: 97.4 °F (36.3 °C)       General: The patient is pleasant and cooperative and in no acute distress.  Ears: The external ear canals are patent and not inflamed.  Mouth: Palate is intact. Mucous membranes are moist.   Chest: Respirations are non-labored.  Clear to auscultation with bilateral breath sounds.  Cardiovascular: Regular rate and rhythm.    Extremities: Moves all extremities equally and symmetrically. BUE & BLE strength: 5/5.  Neurological: No neurological deficits. Alert and oriented.    P/A:  HTN  -Controlled  -Continue on current treatment  -Refilled medications  -Limit salt intake to 2g/day, less fried foods, more grilled foods  -Daily aerobic exercise    HLD  -Controlled  -Continue on current treatment  -Refilled medication      Patient will come to main lab in 1 month for labs

## 2022-05-09 NOTE — PROGRESS NOTES
Faculty Attestation: Patient was seen in [facility]. Patient was seen and evaluated by me. I have personally reviewed History of the Present Illness , Review of Systems, PFSH , Physical Exam, Assessment and Plan documented by the resident. I participated in the management of the patient and was immediately available throughout the encounter. Importance of adherence to treatment recommendations discussed with patient at the time of the visit. Services were furnished in a primary care center in the outpatient department at a [facility] hospital. I discussed the patient with the resident and agree with resident's findings and plan as documented in the resident note.   Agree with ordered labs and plan for close follow up.  Yvonne Silva MD

## 2022-05-09 NOTE — PROGRESS NOTES
Faculty Attestation: Patient was seen in [facility]. Patient was seen and evaluated by me. I have personally reviewed History of the Present Illness , Review of Systems, PFSH , Physical Exam, Assessment and Plan documented by the resident. I participated in the management of the patient and was immediately available throughout the encounter. Importance of adherence to treatment recommendations discussed with patient at the time of the visit. Services were furnished in a primary care center in the outpatient department at a [facility] hospital. I discussed the patient with the resident and agree with resident's findings and plan as documented in the resident note. Yvonne Silva MD

## 2022-10-25 ENCOUNTER — OFFICE VISIT (OUTPATIENT)
Dept: FAMILY MEDICINE | Facility: CLINIC | Age: 82
End: 2022-10-25
Payer: MEDICARE

## 2022-10-25 VITALS
WEIGHT: 126.13 LBS | TEMPERATURE: 98 F | BODY MASS INDEX: 27.21 KG/M2 | HEIGHT: 57 IN | RESPIRATION RATE: 18 BRPM | DIASTOLIC BLOOD PRESSURE: 72 MMHG | OXYGEN SATURATION: 99 % | HEART RATE: 72 BPM | SYSTOLIC BLOOD PRESSURE: 132 MMHG

## 2022-10-25 DIAGNOSIS — E78.5 HYPERLIPIDEMIA, UNSPECIFIED HYPERLIPIDEMIA TYPE: ICD-10-CM

## 2022-10-25 DIAGNOSIS — I10 HYPERTENSION, UNSPECIFIED TYPE: Primary | ICD-10-CM

## 2022-10-25 DIAGNOSIS — Z23 IMMUNIZATION DUE: ICD-10-CM

## 2022-10-25 PROCEDURE — G0008 ADMIN INFLUENZA VIRUS VAC: HCPCS | Mod: PBBFAC

## 2022-10-25 PROCEDURE — 99213 OFFICE O/P EST LOW 20 MIN: CPT | Mod: PBBFAC,25

## 2022-10-25 NOTE — PROGRESS NOTES
Yue Encarnacion is a 82 yr female w/ PMH HTN, HLD, CKD. She was last seen in our clinic 5/6/22. She wants the flu shot and medication refills.     Acute issues:  No complaints or concerns.  Medication refills.    Chronic issues:  Hypertension:  -Currently taking Norvasc 10 mg, Lisinopril 20 mg, ASA 81 mg   -Compliant with medications.  -No side effects of medications.  -Denies chest pain, SOB, abdominal pain, N/V, diaphoresis, headache, vision changes, and dizziness     HLD:  -Currently taking Pravastatin 40 mg   -Compliant with medication  -Denies muscle cramps    Activities of Daily Living:  -Able to bath, dress, transfer, toilet, continent, feed on her own    Instrumental activities of daily living:  -Able to use telephone, shop, prepare meals, housekeeping, laundry, and handle her and her 's medications on her own  -Can drive and drives her best friends car when they play bridge    Healthcare maintenance  -Mammogram: Last one on 8/3/20, BI-RADS 1  -Colon cancer screening: Sigmoidoscopy done 10/10/2016  -Falls: No  -Mobility: Good, no need of walker/cane  -DEXA scan: Done on 13/18/21 showed Normal bone mineral density of the lumbar vertebrae. Improved and mild osteopenia of the femurs.  -COVID vaccine- declined 1/11/22  -Flu vaccine: Due at this time       Review of Systems:  See above     PE  Vitals:    10/25/22 0949   BP: 132/72   Pulse: 72   Resp: 18   Temp: 97.7 °F (36.5 °C)     General: pleasant and cooperative male, in no acute distress  Lungs: Clear to auscultation bilaterally   Heart: RRR  Abdomen: soft, non tender, with positive bowel sounds  Extremities: Moves all extremities equally and symmetrically. BUE & BLE strength 5/5  Neuro: Alert and oriented. No focal neurologic deficits noted.    A/P  HTN   -Refilled medication   -Recommend DASH diet  -Encourage regular aerobic exercise of walking for at least 3-4 days/week     HLD  -Refilled medication    Immunization Due   -Went over risk benefit,  and patient was agreeable to receiving Flu vaccine today in office        RTC in 3 months

## 2022-12-17 ENCOUNTER — OFFICE VISIT (OUTPATIENT)
Dept: URGENT CARE | Facility: CLINIC | Age: 82
End: 2022-12-17
Payer: MEDICARE

## 2022-12-17 VITALS
RESPIRATION RATE: 20 BRPM | WEIGHT: 123 LBS | BODY MASS INDEX: 27.67 KG/M2 | TEMPERATURE: 98 F | HEART RATE: 83 BPM | SYSTOLIC BLOOD PRESSURE: 138 MMHG | DIASTOLIC BLOOD PRESSURE: 85 MMHG | OXYGEN SATURATION: 97 % | HEIGHT: 56 IN

## 2022-12-17 DIAGNOSIS — R09.81 SINUS CONGESTION: Primary | ICD-10-CM

## 2022-12-17 DIAGNOSIS — U07.1 COVID: ICD-10-CM

## 2022-12-17 DIAGNOSIS — U07.1 COVID-19 VIRUS DETECTED: ICD-10-CM

## 2022-12-17 LAB
CTP QC/QA: YES
CTP QC/QA: YES
FLUAV AG NPH QL: NEGATIVE
FLUBV AG NPH QL: NEGATIVE
SARS-COV-2 RDRP RESP QL NAA+PROBE: POSITIVE

## 2022-12-17 PROCEDURE — 87804 INFLUENZA ASSAY W/OPTIC: CPT | Mod: PBBFAC

## 2022-12-17 PROCEDURE — 99213 PR OFFICE/OUTPT VISIT, EST, LEVL III, 20-29 MIN: ICD-10-PCS | Mod: S$PBB,CR,,

## 2022-12-17 PROCEDURE — 99214 OFFICE O/P EST MOD 30 MIN: CPT | Mod: PBBFAC

## 2022-12-17 PROCEDURE — 99213 OFFICE O/P EST LOW 20 MIN: CPT | Mod: S$PBB,CR,,

## 2022-12-17 PROCEDURE — 87635 SARS-COV-2 COVID-19 AMP PRB: CPT | Mod: PBBFAC

## 2022-12-17 RX ORDER — FLUTICASONE PROPIONATE 50 MCG
2 SPRAY, SUSPENSION (ML) NASAL DAILY
Qty: 18.2 ML | Refills: 0 | Status: SHIPPED | OUTPATIENT
Start: 2022-12-17 | End: 2023-09-06

## 2022-12-17 RX ORDER — ALBUTEROL SULFATE 90 UG/1
2 AEROSOL, METERED RESPIRATORY (INHALATION) EVERY 6 HOURS PRN
Qty: 6.7 G | Refills: 0 | Status: SHIPPED | OUTPATIENT
Start: 2022-12-17 | End: 2023-09-06

## 2022-12-17 NOTE — PROGRESS NOTES
"Subjective:       Patient ID: Yue Encarnacion is a 82 y.o. female.    Vitals:  height is 4' 7.91" (1.42 m) and weight is 55.8 kg (123 lb). Her temperature is 97.7 °F (36.5 °C). Her blood pressure is 138/85 and her pulse is 83. Her respiration is 20 and oxygen saturation is 97%.     Chief Complaint: Sinus Problem (X mon)    Pt states sinus congestion, hoarse voice, and headache starting on Monday. States a family member in the home is currently sick with same symptoms.    Sinus Problem  Associated symptoms include congestion, coughing, headaches and sinus pressure.     Constitution: Negative.   HENT:  Positive for congestion and sinus pressure.    Neck: neck negative.   Cardiovascular: Negative.    Eyes: Negative.    Respiratory:  Positive for cough.    Gastrointestinal: Negative.    Genitourinary: Negative.    Musculoskeletal: Negative.    Skin: Negative.    Neurological:  Positive for headaches.     Objective:      Physical Exam   Constitutional: She is oriented to person, place, and time. normal  HENT:   Head: Normocephalic.   Ears:   Right Ear: Tympanic membrane, external ear and ear canal normal.   Left Ear: Tympanic membrane and external ear normal.   Nose: Congestion present.   Mouth/Throat: Uvula is midline, oropharynx is clear and moist and mucous membranes are normal. Mucous membranes are moist. Oropharynx is clear.   Eyes: Pupils are equal, round, and reactive to light.   Neck: Neck supple.   Cardiovascular: Normal rate, regular rhythm, normal heart sounds and normal pulses.   Pulmonary/Chest: Effort normal. She has wheezes.   Abdominal: Normal appearance. Soft.   Musculoskeletal: Normal range of motion.         General: Normal range of motion.   Neurological: She is alert and oriented to person, place, and time.   Skin: Skin is warm and dry.   Vitals reviewed.      Results for orders placed or performed in visit on 12/17/22   POCT COVID-19 Rapid Screening   Result Value Ref Range    POC Rapid COVID Positive " (A) Negative     Acceptable Yes    POCT Influenza A/B   Result Value Ref Range    Rapid Influenza A Ag Negative Negative    Rapid Influenza B Ag Negative Negative     Acceptable Yes        Assessment:       1. Sinus congestion    2. COVID            Plan:         Sinus congestion  -     POCT COVID-19 Rapid Screening  -     POCT Influenza A/B    COVID  -     fluticasone propionate (FLONASE) 50 mcg/actuation nasal spray; 2 sprays (100 mcg total) by Each Nostril route once daily.  Dispense: 18.2 mL; Refill: 0  -     nirmatrelvir-ritonavir 300 mg (150 mg x 2)-100 mg copackaged tablets (EUA); Take 3 tablets by mouth 2 (two) times daily for 5 days. Each dose contains 2 nirmatrelvir (pink tablets) and 1 ritonavir (white tablet). Take all 3 tablets together  Dispense: 30 tablet; Refill: 0  -     albuterol (PROVENTIL HFA) 90 mcg/actuation inhaler; Inhale 2 puffs into the lungs every 6 (six) hours as needed for Wheezing. Rescue  Dispense: 6.7 g; Refill: 0    Other orders  -     Discontinue: nirmatrelvir-ritonavir 300 mg (150 mg x 2)-100 mg copackaged tablets (EUA); Take 3 tablets by mouth 2 (two) times daily for 5 days. Each dose contains 2 nirmatrelvir (pink tablets) and 1 ritonavir (white tablet). Take all 3 tablets together  Dispense: 30 tablet; Refill: 0    Please drink plenty of fluids.  Please get plenty of rest.  Please return here or go to the Emergency Department for any concerns or worsening of condition.      Take over the counter Tylenol (Acetaminophen) and/or Motrin (Ibuprofen) as directed for control of pain and/or fever.  Please follow up with your primary care doctor.        ER precautions given, patient verbalized understanding.     Please see provided patient education for guidance.    Follow up with PCP or return to clinic if symptoms worsen or do not improve.

## 2023-01-27 ENCOUNTER — OFFICE VISIT (OUTPATIENT)
Dept: FAMILY MEDICINE | Facility: CLINIC | Age: 83
End: 2023-01-27
Payer: MEDICARE

## 2023-01-27 VITALS
OXYGEN SATURATION: 98 % | HEART RATE: 70 BPM | HEIGHT: 55 IN | TEMPERATURE: 98 F | RESPIRATION RATE: 16 BRPM | WEIGHT: 123 LBS | SYSTOLIC BLOOD PRESSURE: 122 MMHG | BODY MASS INDEX: 28.46 KG/M2 | DIASTOLIC BLOOD PRESSURE: 66 MMHG

## 2023-01-27 DIAGNOSIS — Z28.21 IMMUNIZATION DECLINED: ICD-10-CM

## 2023-01-27 DIAGNOSIS — I10 HYPERTENSION, UNSPECIFIED TYPE: Primary | ICD-10-CM

## 2023-01-27 PROCEDURE — 99213 OFFICE O/P EST LOW 20 MIN: CPT | Mod: PBBFAC

## 2023-01-27 RX ORDER — NAPROXEN SODIUM 220 MG/1
81 TABLET, FILM COATED ORAL DAILY
Qty: 30 TABLET | Refills: 3 | Status: SHIPPED | OUTPATIENT
Start: 2023-01-27 | End: 2023-06-14 | Stop reason: SDUPTHER

## 2023-01-27 RX ORDER — AMLODIPINE BESYLATE 10 MG/1
10 TABLET ORAL DAILY
Qty: 90 TABLET | Refills: 3 | Status: SHIPPED | OUTPATIENT
Start: 2023-01-27 | End: 2023-06-14 | Stop reason: SDUPTHER

## 2023-01-27 RX ORDER — LISINOPRIL 20 MG/1
20 TABLET ORAL DAILY
Qty: 90 TABLET | Refills: 3 | Status: SHIPPED | OUTPATIENT
Start: 2023-01-27 | End: 2023-06-14 | Stop reason: SDUPTHER

## 2023-01-27 RX ORDER — PRAVASTATIN SODIUM 40 MG/1
40 TABLET ORAL DAILY
Qty: 90 TABLET | Refills: 3 | Status: SHIPPED | OUTPATIENT
Start: 2023-01-27 | End: 2023-06-14 | Stop reason: SDUPTHER

## 2023-01-27 NOTE — PROGRESS NOTES
[facility]     81 y/o female here for routine f/u. Had Covid 6 weeks ago but all symptoms resolved and she feels much better.    Acute issues/CC:  No complaints or concerns.    Chronic issues:  HTN  -Currently taking Norvasc 10 mg, Lisinopril 20 mg, ASA 81 mg   -Compliant with medications and needs refills  -No side effects of medications.  -Denies chest pain, SOB, abdominal pain, N/V, diaphoresis, headache, vision changes, and dizziness      HLD  -Currently taking Pravastatin 40 mg   -Compliant with medication  -Denies muscle cramps    Activities of Daily Living:  -Able to bath, dress, transfer, toilet, continent, feed on her own    Instrumental activities of daily living:  -Able to use telephone, drive, shop, prepare meals, housekeeping, laundry, and handle her and her 's medications on her own     Healthcare maintenance  -Mammogram: Last one on 8/3/20, BI-RADS 1  -Colon cancer screening: Sigmoidoscopy done 10/10/2016  -Falls: None in the last 3 months   -DEXA scan: Done on 13/18/21 showed Normal bone mineral density of the lumbar vertebrae. Improved and mild osteopenia of the femurs.  -Tetanus vaccine: Due at this time      ROS  See above     PE  Vitals:    01/27/23 1010   BP: 122/66   Pulse: 70   Resp: 16   Temp: 97.7 °F (36.5 °C)     General: pleasant and cooperative female, in no acute distress  Lungs: Clear to auscultation bilaterally   Heart: RRR  Abdomen: soft, non tender, with positive bowel sounds  Extremities: Moves all extremities equally and symmetrically. BUE & BLE strength 5/5      A/P   HTN  -Well controlled   -Continue current regimen   -Refilled medications   -I congratulated patient on losing 3 pounds, she has made lifestyle changes like eating more salads in her diet and less fried and greasy foods  -Continue Mediterranean diet  -Encouraged aerobic exercise for at least 3 days/week for 30 mins     Immunization Declined  -Patient not agreeable to Covid and Shingles  vaccines   -Agreeable to receiving Tetanus vaccine at f/u visit as it has been overdue since 2015      RTC in 3 months or sooner as needed

## 2023-06-14 ENCOUNTER — OFFICE VISIT (OUTPATIENT)
Dept: FAMILY MEDICINE | Facility: CLINIC | Age: 83
End: 2023-06-14
Payer: MEDICARE

## 2023-06-14 VITALS
TEMPERATURE: 99 F | SYSTOLIC BLOOD PRESSURE: 117 MMHG | RESPIRATION RATE: 16 BRPM | HEART RATE: 62 BPM | HEIGHT: 55 IN | WEIGHT: 123.81 LBS | BODY MASS INDEX: 28.65 KG/M2 | DIASTOLIC BLOOD PRESSURE: 71 MMHG | OXYGEN SATURATION: 100 %

## 2023-06-14 DIAGNOSIS — Z12.39 ENCOUNTER FOR SCREENING FOR MALIGNANT NEOPLASM OF BREAST, UNSPECIFIED SCREENING MODALITY: ICD-10-CM

## 2023-06-14 DIAGNOSIS — Z12.31 ENCOUNTER FOR SCREENING MAMMOGRAM FOR MALIGNANT NEOPLASM OF BREAST: ICD-10-CM

## 2023-06-14 DIAGNOSIS — Z23 NEED FOR TDAP VACCINATION: Primary | ICD-10-CM

## 2023-06-14 DIAGNOSIS — I10 HYPERTENSION, UNSPECIFIED TYPE: ICD-10-CM

## 2023-06-14 DIAGNOSIS — E78.5 HYPERLIPIDEMIA, UNSPECIFIED HYPERLIPIDEMIA TYPE: ICD-10-CM

## 2023-06-14 DIAGNOSIS — Z23 NEED FOR VACCINATION: ICD-10-CM

## 2023-06-14 DIAGNOSIS — Z76.0 MEDICATION REFILL: ICD-10-CM

## 2023-06-14 DIAGNOSIS — Z79.899 ENCOUNTER FOR MEDICATION REVIEW: ICD-10-CM

## 2023-06-14 DIAGNOSIS — U07.1 COVID: ICD-10-CM

## 2023-06-14 LAB
ALBUMIN SERPL-MCNC: 4.3 G/DL (ref 3.4–4.8)
ALBUMIN/GLOB SERPL: 1.2 RATIO (ref 1.1–2)
ALP SERPL-CCNC: 82 UNIT/L (ref 40–150)
ALT SERPL-CCNC: 17 UNIT/L (ref 0–55)
AST SERPL-CCNC: 21 UNIT/L (ref 5–34)
BILIRUBIN DIRECT+TOT PNL SERPL-MCNC: 0.6 MG/DL
BUN SERPL-MCNC: 12.4 MG/DL (ref 9.8–20.1)
CALCIUM SERPL-MCNC: 10.2 MG/DL (ref 8.4–10.2)
CHLORIDE SERPL-SCNC: 105 MMOL/L (ref 98–107)
CO2 SERPL-SCNC: 23 MMOL/L (ref 23–31)
CREAT SERPL-MCNC: 1.02 MG/DL (ref 0.55–1.02)
GFR SERPLBLD CREATININE-BSD FMLA CKD-EPI: 55 MLS/MIN/1.73/M2
GLOBULIN SER-MCNC: 3.6 GM/DL (ref 2.4–3.5)
GLUCOSE SERPL-MCNC: 75 MG/DL (ref 82–115)
POTASSIUM SERPL-SCNC: 4 MMOL/L (ref 3.5–5.1)
PROT SERPL-MCNC: 7.9 GM/DL (ref 5.8–7.6)
SODIUM SERPL-SCNC: 140 MMOL/L (ref 136–145)

## 2023-06-14 PROCEDURE — 36415 COLL VENOUS BLD VENIPUNCTURE: CPT

## 2023-06-14 PROCEDURE — 80053 COMPREHEN METABOLIC PANEL: CPT

## 2023-06-14 PROCEDURE — 99214 OFFICE O/P EST MOD 30 MIN: CPT | Mod: PBBFAC

## 2023-06-14 RX ORDER — AMLODIPINE BESYLATE 10 MG/1
10 TABLET ORAL DAILY
Qty: 90 TABLET | Refills: 0 | Status: SHIPPED | OUTPATIENT
Start: 2023-06-14 | End: 2023-09-06 | Stop reason: SDUPTHER

## 2023-06-14 RX ORDER — NAPROXEN SODIUM 220 MG/1
81 TABLET, FILM COATED ORAL DAILY
Qty: 30 TABLET | Refills: 3 | Status: SHIPPED | OUTPATIENT
Start: 2023-06-14 | End: 2024-03-08 | Stop reason: SDUPTHER

## 2023-06-14 RX ORDER — PRAVASTATIN SODIUM 40 MG/1
40 TABLET ORAL DAILY
Qty: 90 TABLET | Refills: 3 | Status: SHIPPED | OUTPATIENT
Start: 2023-06-14 | End: 2023-09-06 | Stop reason: SDUPTHER

## 2023-06-14 RX ORDER — AMLODIPINE BESYLATE 10 MG/1
10 TABLET ORAL DAILY
Qty: 90 TABLET | Refills: 3 | Status: CANCELLED | OUTPATIENT
Start: 2023-06-14

## 2023-06-14 RX ORDER — LISINOPRIL 20 MG/1
20 TABLET ORAL DAILY
Qty: 90 TABLET | Refills: 3 | Status: SHIPPED | OUTPATIENT
Start: 2023-06-14 | End: 2023-09-06 | Stop reason: SDUPTHER

## 2023-06-14 NOTE — PROGRESS NOTES
[facility]     83 y/o female PMH HLD, HTN, CKDII. Last seen in [facility] 1/2023, here for FU on chronic issues. No C/C     HTN  - JESSEE at goal in clinic  - adh to Norvasc 10 mg, Lisinopril 20 mg, ASA 81 mg     HLD  -Currently taking Pravastatin 40 mg   -Compliant with medication  -Denies muscle cramps    100% ADLs independent     ROS  Denies chest pain, SOB, wheezing, leg cramps     PE  Vitals:    06/14/23 0813   BP: 117/71   Pulse: 62   Resp: 16   Temp: 98.7 °F (37.1 °C)     General: no acute distress,  at side   CVS: RRR no murmur, radial pulses 2+ BL no edema  Resp: CTA  GI: nonTTP    A/P   HTN  HLD   Need for vaccination   Encounter for breast Ca screen    CBC WNL 11/2020  CMP grossly WNL other than eGFR 80 11/2020;l repeat pending   Lipid WNL other than  11/2020  TSH WNL 11/2020  Vit D WNL 11/2020  A1C WNL 11/2020  Micro/Cr low 6/2019    HIV, Hep, RPR declined  -Mammogram 8/3/20, BI-RADS 1 BL; repeat pending   -Colon cancer screening: Sigmoidoscopy 10/10/2016  -Falls: None in the last 3 months   -DEXA scan: 3/2021 Normal bone density at lumbar vertebrae,  mild osteopenia of the femurs.  -Tetanus vaccine: Due at this time     vaccinations  Declined Covid, Shingles     Tdap given today     SEAN Morales MD HOIII  [facility]

## 2023-06-22 ENCOUNTER — HOSPITAL ENCOUNTER (OUTPATIENT)
Dept: RADIOLOGY | Facility: HOSPITAL | Age: 83
Discharge: HOME | End: 2023-06-22
Attending: STUDENT IN AN ORGANIZED HEALTH CARE EDUCATION/TRAINING PROGRAM
Payer: MEDICARE

## 2023-06-22 DIAGNOSIS — Z12.31 ENCOUNTER FOR SCREENING MAMMOGRAM FOR MALIGNANT NEOPLASM OF BREAST: ICD-10-CM

## 2023-06-22 DIAGNOSIS — Z12.39 ENCOUNTER FOR SCREENING FOR MALIGNANT NEOPLASM OF BREAST, UNSPECIFIED SCREENING MODALITY: ICD-10-CM

## 2023-06-22 PROCEDURE — 77067 SCR MAMMO BI INCL CAD: CPT | Mod: TC

## 2023-06-22 PROCEDURE — 77067 SCR MAMMO BI INCL CAD: CPT | Mod: 26,,, | Performed by: RADIOLOGY

## 2023-06-22 PROCEDURE — 77063 MAMMO DIGITAL SCREENING BILAT WITH TOMO: ICD-10-PCS | Mod: 26,,, | Performed by: RADIOLOGY

## 2023-06-22 PROCEDURE — 77063 BREAST TOMOSYNTHESIS BI: CPT | Mod: 26,,, | Performed by: RADIOLOGY

## 2023-06-22 PROCEDURE — 77067 MAMMO DIGITAL SCREENING BILAT WITH TOMO: ICD-10-PCS | Mod: 26,,, | Performed by: RADIOLOGY

## 2023-07-02 NOTE — PROGRESS NOTES
Faculty Attestation: Patient was seen in [facility] clinic. Patient was seen and examined by me at the time of the visit. I have personally reviewed History of the Present Illness , Review of Systems, PFSH , Physical Exam, Assessment and Plan documented by the resident. I participated in the management of the patient and was immediately available throughout the encounter. Importance of adherence to treatment recommendations discussed with patient at the time of the visit. Services were furnished in a primary care center in the outpatient department at a teaching hospital. I discussed the patient with the resident and agree with resident's findings and plan as documented in the resident note. Yvonne Silva MD

## 2023-09-06 ENCOUNTER — OFFICE VISIT (OUTPATIENT)
Dept: FAMILY MEDICINE | Facility: CLINIC | Age: 83
End: 2023-09-06
Payer: MEDICARE

## 2023-09-06 VITALS
TEMPERATURE: 98 F | OXYGEN SATURATION: 96 % | BODY MASS INDEX: 27.72 KG/M2 | WEIGHT: 119.81 LBS | HEART RATE: 65 BPM | SYSTOLIC BLOOD PRESSURE: 132 MMHG | DIASTOLIC BLOOD PRESSURE: 71 MMHG | HEIGHT: 55 IN | RESPIRATION RATE: 20 BRPM

## 2023-09-06 DIAGNOSIS — I10 HYPERTENSION, UNSPECIFIED TYPE: Primary | ICD-10-CM

## 2023-09-06 DIAGNOSIS — E78.5 HYPERLIPIDEMIA, UNSPECIFIED HYPERLIPIDEMIA TYPE: ICD-10-CM

## 2023-09-06 PROCEDURE — 99214 OFFICE O/P EST MOD 30 MIN: CPT | Mod: PBBFAC | Performed by: STUDENT IN AN ORGANIZED HEALTH CARE EDUCATION/TRAINING PROGRAM

## 2023-09-06 RX ORDER — AMLODIPINE BESYLATE 10 MG/1
10 TABLET ORAL DAILY
Qty: 90 TABLET | Refills: 1 | Status: SHIPPED | OUTPATIENT
Start: 2023-09-06 | End: 2024-03-08 | Stop reason: SDUPTHER

## 2023-09-06 RX ORDER — LISINOPRIL 20 MG/1
20 TABLET ORAL DAILY
Qty: 90 TABLET | Refills: 1 | Status: SHIPPED | OUTPATIENT
Start: 2023-09-06 | End: 2024-03-08 | Stop reason: SDUPTHER

## 2023-09-06 RX ORDER — PRAVASTATIN SODIUM 40 MG/1
40 TABLET ORAL DAILY
Qty: 90 TABLET | Refills: 1 | Status: SHIPPED | OUTPATIENT
Start: 2023-09-06 | End: 2024-03-08 | Stop reason: SDUPTHER

## 2023-09-06 NOTE — PROGRESS NOTES
Chief Complaint  Follow-up (3 month f/u states trying to lose wt.)      History of Present Illness  Yue Encarnacion is a 83 y.o.  female presents to the clinic for routine f/u accompanied by spouse; last seen 6/16/2023    Acute concerns: None    Chronic conditions:    HTN: Norvasc 10mg, Lisinopril 20mg, ASA 81mg on MWF. No CP, HA, SOB, palpitations, constipation, LE edema, fatigue, exertional symptoms  HLD: Pravastatin 40mg, denies myalgias  CKD II: stable    Independent with ADLs, no urinary/bladder incontinence, memory concerns, intentional weight loss with diet modification       ROS per HPI      Physical Exam    Vitals:    09/06/23 0805   BP: 132/71   Pulse: 65   Resp: 20   Temp: 97.9 °F (36.6 °C)     Wt Readings from Last 2 Encounters:   09/06/23 54.3 kg (119 lb 12.8 oz)   06/14/23 56.2 kg (123 lb 12.8 oz)     Gen: NAD  Pulm: Nonlabored, CTABL  CV: RRR, no MRG, no carotid/renal bruits, no peripheral edema  Abd: Soft, NT, ND, Normoactive BS  MSK: no gait abnormalities      Current Outpatient Medications  Current Outpatient Medications   Medication Instructions    amLODIPine (NORVASC) 10 mg, Oral, Daily    aspirin 81 mg, Oral, Daily    lisinopriL (PRINIVIL,ZESTRIL) 20 mg, Oral, Daily    pravastatin (PRAVACHOL) 40 mg, Oral, Daily             Assessment / Plan:    Hypertension, unspecified type  Discussed risk and benefit of ASA 81mg. Patient elects to continue at this time on MWF  BP at goal  Refilled and continued current management   -     amLODIPine (NORVASC) 10 MG tablet; Take 1 tablet (10 mg total) by mouth once daily.  Dispense: 90 tablet; Refill: 1  -     lisinopriL (PRINIVIL,ZESTRIL) 20 MG tablet; Take 1 tablet (20 mg total) by mouth once daily.  Dispense: 90 tablet; Refill: 1    Hyperlipidemia, unspecified hyperlipidemia type  Refilled and continued current management   -     pravastatin (PRAVACHOL) 40 MG tablet; Take 1 tablet (40 mg total) by mouth once daily.  Dispense: 90 tablet; Refill:  1            Follow up:    In 6 months, or earlier if needed.     Marge Tanner MD  [employer] FM PGY-3

## 2023-09-07 NOTE — PROGRESS NOTES
Faculty addendum: Patient discussed and examined with resident.  Care provided reasonable and necessary. I participated in the management of the patient and was immediately available throughout the encounter. Services were furnished in a primary care center located in the outpatient department of a [facility] hospital. I agree with the resident's findings and plan as documented in the resident's note.     Physical exam:well appearing, in no acute distress, RRR, non labored on room air    Falguni Villalba DO

## 2024-03-08 ENCOUNTER — OFFICE VISIT (OUTPATIENT)
Dept: FAMILY MEDICINE | Facility: CLINIC | Age: 84
End: 2024-03-08
Payer: MEDICARE

## 2024-03-08 VITALS
TEMPERATURE: 98 F | HEIGHT: 55 IN | BODY MASS INDEX: 27.68 KG/M2 | HEART RATE: 65 BPM | OXYGEN SATURATION: 97 % | WEIGHT: 119.63 LBS | DIASTOLIC BLOOD PRESSURE: 66 MMHG | SYSTOLIC BLOOD PRESSURE: 134 MMHG

## 2024-03-08 DIAGNOSIS — Z79.899 LONG TERM USE OF DRUG: ICD-10-CM

## 2024-03-08 DIAGNOSIS — I10 HYPERTENSION, UNSPECIFIED TYPE: Primary | ICD-10-CM

## 2024-03-08 DIAGNOSIS — E78.5 HYPERLIPIDEMIA, UNSPECIFIED HYPERLIPIDEMIA TYPE: ICD-10-CM

## 2024-03-08 LAB
ANION GAP SERPL CALC-SCNC: 12 MEQ/L
BUN SERPL-MCNC: 17.7 MG/DL (ref 9.8–20.1)
CALCIUM SERPL-MCNC: 10.3 MG/DL (ref 8.4–10.2)
CHLORIDE SERPL-SCNC: 106 MMOL/L (ref 98–107)
CHOLEST SERPL-MCNC: 172 MG/DL
CHOLEST/HDLC SERPL: 3 {RATIO} (ref 0–5)
CO2 SERPL-SCNC: 21 MMOL/L (ref 23–31)
CREAT SERPL-MCNC: 0.99 MG/DL (ref 0.55–1.02)
CREAT/UREA NIT SERPL: 18
DEPRECATED CALCIDIOL+CALCIFEROL SERPL-MC: 53.6 NG/ML (ref 30–80)
GFR SERPLBLD CREATININE-BSD FMLA CKD-EPI: 57 MLS/MIN/1.73/M2
GLUCOSE SERPL-MCNC: 83 MG/DL (ref 82–115)
HDLC SERPL-MCNC: 61 MG/DL (ref 35–60)
LDLC SERPL CALC-MCNC: 96 MG/DL (ref 50–140)
POTASSIUM SERPL-SCNC: 4.1 MMOL/L (ref 3.5–5.1)
SODIUM SERPL-SCNC: 139 MMOL/L (ref 136–145)
TRIGL SERPL-MCNC: 77 MG/DL (ref 37–140)
TSH SERPL-ACNC: 2.21 UIU/ML (ref 0.35–4.94)
VLDLC SERPL CALC-MCNC: 15 MG/DL

## 2024-03-08 PROCEDURE — 80061 LIPID PANEL: CPT | Performed by: STUDENT IN AN ORGANIZED HEALTH CARE EDUCATION/TRAINING PROGRAM

## 2024-03-08 PROCEDURE — 84443 ASSAY THYROID STIM HORMONE: CPT | Performed by: STUDENT IN AN ORGANIZED HEALTH CARE EDUCATION/TRAINING PROGRAM

## 2024-03-08 PROCEDURE — 36415 COLL VENOUS BLD VENIPUNCTURE: CPT | Performed by: STUDENT IN AN ORGANIZED HEALTH CARE EDUCATION/TRAINING PROGRAM

## 2024-03-08 PROCEDURE — 80048 BASIC METABOLIC PNL TOTAL CA: CPT | Performed by: STUDENT IN AN ORGANIZED HEALTH CARE EDUCATION/TRAINING PROGRAM

## 2024-03-08 PROCEDURE — 99213 OFFICE O/P EST LOW 20 MIN: CPT | Mod: PBBFAC | Performed by: STUDENT IN AN ORGANIZED HEALTH CARE EDUCATION/TRAINING PROGRAM

## 2024-03-08 PROCEDURE — 82306 VITAMIN D 25 HYDROXY: CPT | Performed by: STUDENT IN AN ORGANIZED HEALTH CARE EDUCATION/TRAINING PROGRAM

## 2024-03-08 RX ORDER — AMLODIPINE BESYLATE 10 MG/1
10 TABLET ORAL DAILY
Qty: 90 TABLET | Refills: 1 | Status: SHIPPED | OUTPATIENT
Start: 2024-03-08

## 2024-03-08 RX ORDER — LISINOPRIL 20 MG/1
20 TABLET ORAL DAILY
Qty: 90 TABLET | Refills: 1 | Status: SHIPPED | OUTPATIENT
Start: 2024-03-08 | End: 2025-03-08

## 2024-03-08 RX ORDER — PRAVASTATIN SODIUM 40 MG/1
40 TABLET ORAL DAILY
Qty: 90 TABLET | Refills: 1 | Status: SHIPPED | OUTPATIENT
Start: 2024-03-08 | End: 2025-03-08

## 2024-03-08 RX ORDER — NAPROXEN SODIUM 220 MG/1
81 TABLET, FILM COATED ORAL DAILY
Qty: 30 TABLET | Refills: 3 | Status: SHIPPED | OUTPATIENT
Start: 2024-03-08 | End: 2025-03-08

## 2024-03-08 NOTE — PROGRESS NOTES
Patient was seen and evaluated with the resident on the DOS.   Services were provided at an outpatient teaching facility.   I have reviewed the resident's note and agree with PE.   The assessment, plan and management are reasonable and appropriate.      Eri Lakhani MD  Family Medicine  [facility]

## 2024-03-08 NOTE — PROGRESS NOTES
Chief Complaint  Hypertension and Medication Refill      History of Present Illness  Yue Encarnacion is a 84 y.o. female presents to the clinic for routine f/u; last seen 9/06/2023    Acute concerns: None     Chronic conditions:     HTN: Norvasc 10mg, Lisinopril 20mg, self discontinued ASA. No CP, HA, SOB, palpitations, constipation, LE edema, fatigue, exertional symptoms  HLD: Pravastatin 40mg, denies myalgias  CKD II: last CMP 6/2023     Independent with ADLs, no urinary/bladder incontinence, memory concerns, still able to drive    ROS per HPI      Physical Exam    Vitals:    03/08/24 0810   BP: 134/66   Pulse: 65   Temp: 97.7 °F (36.5 °C)     Wt Readings from Last 2 Encounters:   03/08/24 54.3 kg (119 lb 9.6 oz)   09/06/23 54.3 kg (119 lb 12.8 oz)     Gen: NAD  HEENT: No thyroid goiter nodules, tenderness  Pulm: Nonlabored, CTABL  CV: RRR, no MRG, no carotid bruits, no peripheral edema  MSK: no gait abnormalities      Current Outpatient Medications  Current Outpatient Medications   Medication Instructions    amLODIPine (NORVASC) 10 mg, Oral, Daily    aspirin 81 mg, Oral, Daily    lisinopriL (PRINIVIL,ZESTRIL) 20 mg, Oral, Daily    pravastatin (PRAVACHOL) 40 mg, Oral, Daily             Assessment / Plan:    Hypertension, unspecified type  BP at goal  Refilled and continued current management   -     Basic Metabolic Panel; Future; Expected date: 03/08/2024  -     aspirin 81 MG Chew; Take 1 tablet (81 mg total) by mouth once daily.  Dispense: 30 tablet; Refill: 3  -     lisinopriL (PRINIVIL,ZESTRIL) 20 MG tablet; Take 1 tablet (20 mg total) by mouth once daily.  Dispense: 90 tablet; Refill: 1  -     amLODIPine (NORVASC) 10 MG tablet; Take 1 tablet (10 mg total) by mouth once daily.  Dispense: 90 tablet; Refill: 1    Hyperlipidemia, unspecified hyperlipidemia type  -     Lipid Panel; Future; Expected date: 03/08/2024  -     pravastatin (PRAVACHOL) 40 MG tablet; Take 1 tablet (40 mg total) by mouth once daily.   Dispense: 90 tablet; Refill: 1    Long term use of drug  -     TSH; Future; Expected date: 03/08/2024  -     Vitamin D; Future; Expected date: 03/08/2024            Follow up:    In 6 months, or earlier if needed. Establish with new PCP    Marge Tanner MD  [employer]  PGY-3

## 2024-09-16 DIAGNOSIS — I10 HYPERTENSION, UNSPECIFIED TYPE: ICD-10-CM

## 2024-09-16 RX ORDER — LISINOPRIL 20 MG/1
20 TABLET ORAL DAILY
Qty: 90 TABLET | Refills: 1 | Status: SHIPPED | OUTPATIENT
Start: 2024-09-16 | End: 2025-09-16

## 2024-09-19 DIAGNOSIS — I10 HYPERTENSION, UNSPECIFIED TYPE: ICD-10-CM

## 2024-09-19 RX ORDER — AMLODIPINE BESYLATE 10 MG/1
10 TABLET ORAL DAILY
Qty: 90 TABLET | Refills: 1 | Status: SHIPPED | OUTPATIENT
Start: 2024-09-19 | End: 2024-09-20 | Stop reason: SDUPTHER

## 2024-09-20 DIAGNOSIS — I10 HYPERTENSION, UNSPECIFIED TYPE: ICD-10-CM

## 2024-09-21 RX ORDER — AMLODIPINE BESYLATE 10 MG/1
10 TABLET ORAL DAILY
Qty: 90 TABLET | Refills: 1 | Status: SHIPPED | OUTPATIENT
Start: 2024-09-21

## 2024-11-05 NOTE — PROGRESS NOTES
"[facility] OFFICE VISIT NOTE  Yue Encarnacion  84 y.o.  [id number]  2024      Chief Complaint: Follow-up (HTN)      HPI:  Current issues:   No health concerns at this time.   Medication refills.  HTN - Compliant. Denies HA, blurred vision, CP or SOB  HLD - Compliant.   CKD - States never been confirmed.   ADLs - Ralls  IADLs - Able to use phone, kitchen equipment, cleaning tools    HM:  Influenza vaccine (completed 10/30/2024 at [facility])  Pneumococcal vaccines  Shingles vaccine  Tetanus vaccine  DEXA scan (last 3/2021) - Normal bone mineral density of lumbar vertebrae and improved and mild osteopenia of femurs  Mammogram (last 8/3/2020) - BI-RADS 1 negative bilaterally.        PMH:  HLD  HTN  CKD stage II    PSH:  Tonsillectomy  Tubal ligation     SH:  Smoking tobacco: None    Drinking Alcohol: Wine 1 glass per each holiday  Recreational drugs: None   Education: High school   Occupation: Retired from [employer] ()  Diet: No limit  Exercise: Walking 30 min x 3 times per week, gardening     FH:  Father:  at (age), tobacco abuse  Mother:  at (age) w/ unknown health issues  Children: Healthy   Siblings: Brother had malignant lymphoma    Allergies:  KNDA    Medications:  See list below  Current Outpatient Medications   Medication Instructions    amLODIPine (NORVASC) 10 mg, Oral, Daily    lisinopriL (PRINIVIL,ZESTRIL) 20 mg, Oral, Daily    pravastatin (PRAVACHOL) 40 mg, Oral, Daily         Vitals:    24 0823   BP: 122/69   BP Location: Right arm   Patient Position: Sitting   Pulse: 70   Temp: 97.6 °F (36.4 °C)   TempSrc: Oral   SpO2: 98%   Weight: 53.5 kg (118 lb)   Height: 4' 7" (1.397 m)       Physical Exam  GEN: NAD  NECK: Supple, no LAD, no bruit  RESP: CTAB, no wheezing, rhonchi or rales  CV: RRR, s1/s2, no murmur  ABD: Non-tender, BS+     Assessment and Plan  Primary hypertension   Asymptomatic   Continue Norvasc 10 mg daily and lisinopril 20 mg daily, refill " today    Hyperlipidemia, unspecified  Lipid profile (11/6/2024) showed elevated HDL 64  Continue pravastatin 40 mg daily, refill today    Hx of CKD  Currently normotensive   BMP (11/6/2024): Cr 1.01 and eGFR 56  Repeat urine microalbumin/creatinine ratio for proteinuria, result pending    Menopausal state  Hx of osteopenia  Risk for fall  Ordered DEXA scan, result pending    Patient voices understanding and agrees to the plan discussed. All patient's questions have been answered at this time. She is scheduled RTC 6 months, sooner if needed.     Ezequiel Lutz MD  [facility], -1  11/06/2024

## 2024-11-06 ENCOUNTER — OFFICE VISIT (OUTPATIENT)
Dept: FAMILY MEDICINE | Facility: CLINIC | Age: 84
End: 2024-11-06
Payer: MEDICARE

## 2024-11-06 VITALS
TEMPERATURE: 98 F | OXYGEN SATURATION: 98 % | HEART RATE: 70 BPM | BODY MASS INDEX: 27.31 KG/M2 | SYSTOLIC BLOOD PRESSURE: 122 MMHG | WEIGHT: 118 LBS | HEIGHT: 55 IN | DIASTOLIC BLOOD PRESSURE: 69 MMHG

## 2024-11-06 DIAGNOSIS — Z78.0 ASYMPTOMATIC MENOPAUSAL STATE: ICD-10-CM

## 2024-11-06 DIAGNOSIS — I10 HYPERTENSION, UNSPECIFIED TYPE: Primary | ICD-10-CM

## 2024-11-06 DIAGNOSIS — E78.5 HYPERLIPIDEMIA, UNSPECIFIED HYPERLIPIDEMIA TYPE: ICD-10-CM

## 2024-11-06 DIAGNOSIS — M85.80 OSTEOPENIA, UNSPECIFIED LOCATION: ICD-10-CM

## 2024-11-06 LAB
ALBUMIN SERPL-MCNC: 4 G/DL (ref 3.4–4.8)
ALBUMIN/GLOB SERPL: 1 RATIO (ref 1.1–2)
ALP SERPL-CCNC: 81 UNIT/L (ref 40–150)
ALT SERPL-CCNC: 11 UNIT/L (ref 0–55)
ANION GAP SERPL CALC-SCNC: 9 MEQ/L
AST SERPL-CCNC: 19 UNIT/L (ref 5–34)
BASOPHILS # BLD AUTO: 0.04 X10(3)/MCL
BASOPHILS NFR BLD AUTO: 0.5 %
BILIRUB SERPL-MCNC: 0.4 MG/DL
BUN SERPL-MCNC: 21.2 MG/DL (ref 9.8–20.1)
CALCIUM SERPL-MCNC: 9.8 MG/DL (ref 8.4–10.2)
CHLORIDE SERPL-SCNC: 106 MMOL/L (ref 98–107)
CHOLEST SERPL-MCNC: 190 MG/DL
CHOLEST/HDLC SERPL: 3 {RATIO} (ref 0–5)
CO2 SERPL-SCNC: 23 MMOL/L (ref 23–31)
CREAT SERPL-MCNC: 1.01 MG/DL (ref 0.55–1.02)
CREAT UR-MCNC: 19.8 MG/DL (ref 45–106)
CREAT/UREA NIT SERPL: 21
EOSINOPHIL # BLD AUTO: 0.03 X10(3)/MCL (ref 0–0.9)
EOSINOPHIL NFR BLD AUTO: 0.3 %
ERYTHROCYTE [DISTWIDTH] IN BLOOD BY AUTOMATED COUNT: 13.1 % (ref 11.5–17)
GFR SERPLBLD CREATININE-BSD FMLA CKD-EPI: 56 ML/MIN/1.73/M2
GLOBULIN SER-MCNC: 3.9 GM/DL (ref 2.4–3.5)
GLUCOSE SERPL-MCNC: 89 MG/DL (ref 82–115)
HCT VFR BLD AUTO: 42.6 % (ref 37–47)
HDLC SERPL-MCNC: 64 MG/DL (ref 35–60)
HGB BLD-MCNC: 14.3 G/DL (ref 12–16)
IMM GRANULOCYTES # BLD AUTO: 0.03 X10(3)/MCL (ref 0–0.04)
IMM GRANULOCYTES NFR BLD AUTO: 0.3 %
LDLC SERPL CALC-MCNC: 112 MG/DL (ref 50–140)
LYMPHOCYTES # BLD AUTO: 3.19 X10(3)/MCL (ref 0.6–4.6)
LYMPHOCYTES NFR BLD AUTO: 36.4 %
MCH RBC QN AUTO: 30.5 PG (ref 27–31)
MCHC RBC AUTO-ENTMCNC: 33.6 G/DL (ref 33–36)
MCV RBC AUTO: 90.8 FL (ref 80–94)
MICROALBUMIN UR-MCNC: <5 UG/ML
MICROALBUMIN/CREAT RATIO PNL UR: ABNORMAL
MONOCYTES # BLD AUTO: 0.52 X10(3)/MCL (ref 0.1–1.3)
MONOCYTES NFR BLD AUTO: 5.9 %
NEUTROPHILS # BLD AUTO: 4.95 X10(3)/MCL (ref 2.1–9.2)
NEUTROPHILS NFR BLD AUTO: 56.6 %
NRBC BLD AUTO-RTO: 0 %
PLATELET # BLD AUTO: 360 X10(3)/MCL (ref 130–400)
PMV BLD AUTO: 11.5 FL (ref 7.4–10.4)
POTASSIUM SERPL-SCNC: 3.9 MMOL/L (ref 3.5–5.1)
PROT SERPL-MCNC: 7.9 GM/DL (ref 5.8–7.6)
RBC # BLD AUTO: 4.69 X10(6)/MCL (ref 4.2–5.4)
SODIUM SERPL-SCNC: 138 MMOL/L (ref 136–145)
TRIGL SERPL-MCNC: 68 MG/DL (ref 37–140)
VLDLC SERPL CALC-MCNC: 14 MG/DL
WBC # BLD AUTO: 8.76 X10(3)/MCL (ref 4.5–11.5)

## 2024-11-06 PROCEDURE — 80061 LIPID PANEL: CPT

## 2024-11-06 PROCEDURE — 85025 COMPLETE CBC W/AUTO DIFF WBC: CPT

## 2024-11-06 PROCEDURE — 80053 COMPREHEN METABOLIC PANEL: CPT

## 2024-11-06 PROCEDURE — 82570 ASSAY OF URINE CREATININE: CPT

## 2024-11-06 PROCEDURE — 99213 OFFICE O/P EST LOW 20 MIN: CPT | Mod: PBBFAC

## 2024-11-06 PROCEDURE — 36415 COLL VENOUS BLD VENIPUNCTURE: CPT

## 2024-11-06 RX ORDER — PRAVASTATIN SODIUM 40 MG/1
40 TABLET ORAL DAILY
Qty: 90 TABLET | Refills: 1 | Status: SHIPPED | OUTPATIENT
Start: 2024-11-06 | End: 2025-05-05

## 2024-11-06 RX ORDER — AMLODIPINE BESYLATE 10 MG/1
10 TABLET ORAL DAILY
Qty: 90 TABLET | Refills: 1 | Status: SHIPPED | OUTPATIENT
Start: 2024-11-06

## 2024-11-06 RX ORDER — LISINOPRIL 20 MG/1
20 TABLET ORAL DAILY
Qty: 90 TABLET | Refills: 1 | Status: SHIPPED | OUTPATIENT
Start: 2024-11-06 | End: 2025-05-05

## 2025-05-08 ENCOUNTER — OFFICE VISIT (OUTPATIENT)
Dept: FAMILY MEDICINE | Facility: CLINIC | Age: 85
End: 2025-05-08
Payer: MEDICARE

## 2025-05-08 VITALS
TEMPERATURE: 98 F | SYSTOLIC BLOOD PRESSURE: 115 MMHG | DIASTOLIC BLOOD PRESSURE: 66 MMHG | OXYGEN SATURATION: 99 % | WEIGHT: 118.81 LBS | RESPIRATION RATE: 20 BRPM | HEART RATE: 60 BPM | HEIGHT: 55 IN | BODY MASS INDEX: 27.49 KG/M2

## 2025-05-08 DIAGNOSIS — E78.5 HYPERLIPIDEMIA, UNSPECIFIED HYPERLIPIDEMIA TYPE: ICD-10-CM

## 2025-05-08 DIAGNOSIS — I10 HYPERTENSION, UNSPECIFIED TYPE: Primary | ICD-10-CM

## 2025-05-08 DIAGNOSIS — L57.0 ACTINIC KERATOSIS DUE TO EXPOSURE TO SUNLIGHT: ICD-10-CM

## 2025-05-08 PROCEDURE — 99214 OFFICE O/P EST MOD 30 MIN: CPT | Mod: PBBFAC

## 2025-05-08 RX ORDER — AMLODIPINE BESYLATE 10 MG/1
10 TABLET ORAL DAILY
Qty: 90 TABLET | Refills: 1 | Status: SHIPPED | OUTPATIENT
Start: 2025-05-08

## 2025-05-08 RX ORDER — LISINOPRIL 20 MG/1
20 TABLET ORAL DAILY
Qty: 90 TABLET | Refills: 1 | Status: SHIPPED | OUTPATIENT
Start: 2025-05-08 | End: 2025-11-04

## 2025-05-08 RX ORDER — PRAVASTATIN SODIUM 40 MG/1
40 TABLET ORAL DAILY
Qty: 90 TABLET | Refills: 1 | Status: SHIPPED | OUTPATIENT
Start: 2025-05-08 | End: 2025-11-04

## 2025-05-08 NOTE — PROGRESS NOTES
"[facility] OFFICE VISIT NOTE  Yue Encarnacion  [id number]  05/08[birth year]2025    Chief Complaint   Patient presents with    Hypertension    Medication Refill     All medications       Yue Encarnacion is a 85 y.o. female  presenting to [facility] for HTN and medication refills.      History of Present Illness: no complaints or concerns today    HTN  -BP at goal (<140/90) today   -Current medications Norvasc 10 mg QD and Lisinopril 20 mg QD  -Denies HA, blurred vision, CP or SOB     HLD  -Current Medications: Pravastatin 40 mg QD  -Denies myalgias       Health Maintenance:  DEXA scan (last 3/2021) - Normal bone mineral density of lumbar vertebrae and improved and mild osteopenia of femurs  Mammogram (last 8/3/2020) - BI-RADS 1 negative bilaterally.  Colonoscopy 10/10/16: Left sided Diverticulosis; Would not like to do further screening     Current Outpatient Medications   Medication Instructions    amLODIPine (NORVASC) 10 mg, Oral, Daily    lisinopriL (PRINIVIL,ZESTRIL) 20 mg, Oral, Daily    pravastatin (PRAVACHOL) 40 mg, Oral, Daily        Review of Systems   Constitutional:  Negative for activity change, appetite change, fatigue and fever.   Respiratory:  Negative for chest tightness and shortness of breath.    Cardiovascular:  Negative for chest pain.   Gastrointestinal:  Negative for abdominal pain, constipation, diarrhea, nausea and vomiting.   Genitourinary:  Negative for difficulty urinating.   Musculoskeletal:  Negative for myalgias.   Neurological:  Negative for dizziness, light-headedness and headaches.       Blood pressure 115/66, pulse 60, temperature 97.8 °F (36.6 °C), temperature source Oral, resp. rate 20, height 4' 7" (1.397 m), weight 53.9 kg (118 lb 12.8 oz), SpO2 99%.   Physical Exam  Vitals and nursing note reviewed.   Constitutional:       General: She is not in acute distress.  HENT:      Head: Normocephalic.      Nose: Nose normal. No congestion or rhinorrhea.      Mouth/Throat:      Mouth: Mucous membranes are " moist.      Comments: Poor dentition    Eyes:      Extraocular Movements: Extraocular movements intact.      Pupils: Pupils are equal, round, and reactive to light.      Comments: Wearing glasses    Cardiovascular:      Rate and Rhythm: Normal rate and regular rhythm.      Pulses: Normal pulses.      Heart sounds: Normal heart sounds. No murmur heard.  Pulmonary:      Effort: Pulmonary effort is normal. No respiratory distress.   Abdominal:      General: Bowel sounds are normal.      Tenderness: There is no abdominal tenderness.   Skin:     General: Skin is warm.      Comments: Dry scales areas on arms    Neurological:      Mental Status: She is alert and oriented to person, place, and time.           Assessment/Plan:  1. Hypertension, unspecified type    2. Hyperlipidemia, unspecified hyperlipidemia type  - pravastatin (PRAVACHOL) 40 MG tablet; Take 1 tablet (40 mg total) by mouth once daily.  Dispense: 90 tablet; Refill: 1    3. Actinic keratosis due to exposure to sunlight  - Ambulatory referral/consult to Family Practice; Future       Plan:  -Continue to take medications as prescribed  -Consider discussing Advance Directive at next visit   -Medications refilled   -F/U in Minor Procedure Surgery for AKs    Orders Placed This Encounter    Ambulatory referral/consult to Family Practice    amLODIPine (NORVASC) 10 MG tablet    lisinopriL (PRINIVIL,ZESTRIL) 20 MG tablet    pravastatin (PRAVACHOL) 40 MG tablet       Return to clinic in 6 months for follow up, or sooner if needed.     Judi Harley MD  [facility], [facility]